# Patient Record
Sex: MALE | Race: WHITE | NOT HISPANIC OR LATINO | URBAN - METROPOLITAN AREA
[De-identification: names, ages, dates, MRNs, and addresses within clinical notes are randomized per-mention and may not be internally consistent; named-entity substitution may affect disease eponyms.]

---

## 2024-04-30 ENCOUNTER — TELEMEDICINE (OUTPATIENT)
Dept: ENDOCRINOLOGY | Facility: CLINIC | Age: 34
End: 2024-04-30
Payer: COMMERCIAL

## 2024-04-30 DIAGNOSIS — Z31.41 FERTILITY TESTING: Primary | ICD-10-CM

## 2024-04-30 DIAGNOSIS — Z13.71 SCREENING FOR GENETIC DISEASE CARRIER STATUS: ICD-10-CM

## 2024-04-30 PROCEDURE — 99202 OFFICE O/P NEW SF 15 MIN: CPT

## 2024-04-30 RX ORDER — ATORVASTATIN CALCIUM 10 MG/1
10 TABLET, FILM COATED ORAL DAILY
COMMUNITY

## 2024-04-30 NOTE — PROGRESS NOTES
Virtual or Telephone Consent: An interactive audio and video telecommunication system which permits real time communications between the patient (at the originating site) and provider (at the distant site) was utilized to provide this telehealth service    Patient is the partner of Sherron Alva,  1991, who presents for a new patient virtual visit to establish care to order fertility testing and treatment. 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  Prior fertility history: None  PMH: hayfever, high cholesterol, and asthma symptoms with allergies  PSH:  None  Smoking: No  Alcohol Use: Yes  Drug Use: No  Medications: Lipitor, allergy medication   Injuries: No  STD: No  Please select all that are applicable:    SA: Yes  SA Results: Yes- 3-2023 at [facility], unable to find in Epic, but provider noted it was normal, not in partner's epic chart      Plan:  Myriad screening to be ordered- will call [facility] to set up day to sign genetics consent and  kit to have Myriad blood draw- will take to [facility] to have drawn       Follow up Plan:  6 week virtual visit with partner to review test results and treatment options    Nely Davis CNP 24 11:51 AM

## 2024-05-03 ENCOUNTER — LAB (OUTPATIENT)
Dept: LAB | Facility: LAB | Age: 34
End: 2024-05-03
Payer: COMMERCIAL

## 2024-05-03 PROCEDURE — 36415 COLL VENOUS BLD VENIPUNCTURE: CPT

## 2024-05-16 LAB — SCAN RESULT: NORMAL

## 2024-07-11 DIAGNOSIS — Z11.3 ENCOUNTER FOR SCREENING EXAMINATION FOR SEXUALLY TRANSMITTED DISEASE: ICD-10-CM

## 2024-07-15 ENCOUNTER — APPOINTMENT (OUTPATIENT)
Dept: ENDOCRINOLOGY | Facility: CLINIC | Age: 34
End: 2024-07-15

## 2024-10-23 ENCOUNTER — LAB (OUTPATIENT)
Dept: LAB | Facility: LAB | Age: 34
End: 2024-10-23
Payer: COMMERCIAL

## 2024-10-23 DIAGNOSIS — Z11.3 ENCOUNTER FOR SCREENING EXAMINATION FOR SEXUALLY TRANSMITTED DISEASE: ICD-10-CM

## 2024-10-23 LAB
HBV SURFACE AG SERPL QL IA: NONREACTIVE
HCV AB SER QL: NONREACTIVE
HIV 1+2 AB+HIV1 P24 AG SERPL QL IA: NONREACTIVE
TREPONEMA PALLIDUM IGG+IGM AB [PRESENCE] IN SERUM OR PLASMA BY IMMUNOASSAY: NONREACTIVE

## 2024-10-23 PROCEDURE — 87340 HEPATITIS B SURFACE AG IA: CPT

## 2024-10-23 PROCEDURE — 86780 TREPONEMA PALLIDUM: CPT

## 2024-10-23 PROCEDURE — 87389 HIV-1 AG W/HIV-1&-2 AB AG IA: CPT

## 2024-10-23 PROCEDURE — 86803 HEPATITIS C AB TEST: CPT

## 2024-10-23 PROCEDURE — 36415 COLL VENOUS BLD VENIPUNCTURE: CPT

## 2024-10-23 PROCEDURE — 87491 CHLMYD TRACH DNA AMP PROBE: CPT

## 2024-10-23 PROCEDURE — 87591 N.GONORRHOEAE DNA AMP PROB: CPT

## 2024-10-24 LAB
C TRACH RRNA SPEC QL NAA+PROBE: NEGATIVE
N GONORRHOEA DNA SPEC QL PROBE+SIG AMP: NEGATIVE

## 2024-10-28 ENCOUNTER — DOCUMENTATION (OUTPATIENT)
Dept: ENDOCRINOLOGY | Facility: CLINIC | Age: 34
End: 2024-10-28
Payer: COMMERCIAL

## 2024-11-21 DIAGNOSIS — Z31.41 FERTILITY TESTING: ICD-10-CM

## 2024-12-02 ENCOUNTER — ANCILLARY PROCEDURE (OUTPATIENT)
Dept: ENDOCRINOLOGY | Facility: CLINIC | Age: 34
End: 2024-12-02

## 2024-12-02 DIAGNOSIS — Z31.41 FERTILITY TESTING: ICD-10-CM

## 2024-12-02 LAB
ABSTINENCE (DAYS): 3 DAYS (ref 2–7)
AGGLUTINATION (SEMEN): NO
ANALYZED TIME:: NORMAL
ANDROLOGY LAB ID#: NORMAL
CLUMPS (SEMEN): NO
COLLECTED COMPLETELY: YES
COLLECTION LOCATION:: NORMAL
COLLECTION METHOD:: NORMAL
CONCENTRATION CN (POST-WASH): 32.52 MILL/ML
CONCENTRATION(SEMEN): 35.98 MILL/ML (ref 15–?)
DEBRIS (SEMEN): YES
LEUKOCYTE (SEMEN): NORMAL
NON PROG. MOTILITY (SEMEN): 13 %
NON PROG. MOTILITY CN (POST-WASH): 7 %
PROG. MOTILITY (SEMEN): 56 % (ref 32–?)
PROG. MOTILITY CN (POST-WASH): 79 %
RECEIVED TIME:: NORMAL
REI PARTNER DOB: NORMAL
REI PARTNER NAME: NORMAL
SEMEN APPEARANCE: NORMAL
SEMEN LIQUEFACTION: NORMAL
SEMEN VISCOSITY: NORMAL
SPERM PROCESS METHOD: NORMAL
TOTAL MOTILITY (SEMEN): 68 % (ref 40–?)
TOTAL MOTILITY CN (POST-WASH): 86 %
TOTAL NO OF MOTILE (SEMEN): 71.23 MILL
TOTAL NO OF MOTILE CN (POST-WASH): 13.95 MILL
TOTAL NO OF RND CELLS (SEMEN): 0.9 MILL (ref ?–5)
TOTAL NO OF SPERM (SEMEN): 104.35 MILL (ref 39–?)
TOTAL NO OF SPERM CN (POST-WASH): 16.26 MILL
VOLUME (SEMEN): 2.9 ML (ref 1.5–?)
VOLUME CN (POST-WASH): 0.5 ML

## 2024-12-02 PROCEDURE — 89261 SPERM ISOLATION COMPLEX: CPT | Performed by: OBSTETRICS & GYNECOLOGY

## 2024-12-20 DIAGNOSIS — Z31.41 FERTILITY TESTING: ICD-10-CM

## 2025-01-17 DIAGNOSIS — Z31.41 FERTILITY TESTING: ICD-10-CM

## 2025-01-26 ENCOUNTER — ANCILLARY PROCEDURE (OUTPATIENT)
Dept: ENDOCRINOLOGY | Facility: CLINIC | Age: 35
End: 2025-01-26

## 2025-01-26 DIAGNOSIS — Z31.41 FERTILITY TESTING: ICD-10-CM

## 2025-01-26 LAB
ABSTINENCE (DAYS): 2 DAYS (ref 2–7)
AGGLUTINATION (SEMEN): NO
ANALYZED TIME:: NORMAL
ANDROLOGY LAB ID#: NORMAL
CLUMPS (SEMEN): NO
COLLECTED COMPLETELY: YES
COLLECTION LOCATION:: NORMAL
COLLECTION METHOD:: NORMAL
CONCENTRATION CN (POST-WASH): 54.05 MILL/ML
CONCENTRATION(SEMEN): 67.17 MILL/ML (ref 15–?)
DEBRIS (SEMEN): YES
NON PROG. MOTILITY (SEMEN): 12 %
NON PROG. MOTILITY CN (POST-WASH): 12 %
PROG. MOTILITY (SEMEN): 55 % (ref 32–?)
PROG. MOTILITY CN (POST-WASH): 73 %
RECEIVED TIME:: NORMAL
REI PARTNER DOB: NORMAL
REI PARTNER NAME: NORMAL
SEMEN APPEARANCE: NORMAL
SEMEN LIQUEFACTION: NORMAL
SEMEN VISCOSITY: NORMAL
SPERM PROCESS METHOD: NORMAL
TOTAL MOTILITY (SEMEN): 67 % (ref 40–?)
TOTAL MOTILITY CN (POST-WASH): 85 %
TOTAL NO OF MOTILE (SEMEN): 90.65 MILL
TOTAL NO OF MOTILE CN (POST-WASH): 22.97 MILL
TOTAL NO OF SPERM (SEMEN): 134.33 MILL (ref 39–?)
TOTAL NO OF SPERM CN (POST-WASH): 27.03 MILL
VOLUME (SEMEN): 2 ML (ref 1.5–?)
VOLUME CN (POST-WASH): 0.5 ML

## 2025-01-26 PROCEDURE — 89261 SPERM ISOLATION COMPLEX: CPT | Performed by: OBSTETRICS & GYNECOLOGY
